# Patient Record
Sex: FEMALE | Race: WHITE | NOT HISPANIC OR LATINO | URBAN - METROPOLITAN AREA
[De-identification: names, ages, dates, MRNs, and addresses within clinical notes are randomized per-mention and may not be internally consistent; named-entity substitution may affect disease eponyms.]

---

## 2017-02-21 ENCOUNTER — OUTPATIENT (OUTPATIENT)
Dept: OUTPATIENT SERVICES | Facility: HOSPITAL | Age: 47
LOS: 1 days | End: 2017-02-21
Payer: COMMERCIAL

## 2017-02-21 VITALS
DIASTOLIC BLOOD PRESSURE: 85 MMHG | HEART RATE: 64 BPM | RESPIRATION RATE: 18 BRPM | HEIGHT: 65 IN | TEMPERATURE: 98 F | WEIGHT: 147.05 LBS | SYSTOLIC BLOOD PRESSURE: 134 MMHG

## 2017-02-21 DIAGNOSIS — D17.20 BENIGN LIPOMATOUS NEOPLASM OF SKIN AND SUBCUTANEOUS TISSUE OF UNSPECIFIED LIMB: ICD-10-CM

## 2017-02-21 DIAGNOSIS — R22.30 LOCALIZED SWELLING, MASS AND LUMP, UNSPECIFIED UPPER LIMB: ICD-10-CM

## 2017-02-21 DIAGNOSIS — Z98.891 HISTORY OF UTERINE SCAR FROM PREVIOUS SURGERY: Chronic | ICD-10-CM

## 2017-02-21 DIAGNOSIS — Z01.818 ENCOUNTER FOR OTHER PREPROCEDURAL EXAMINATION: ICD-10-CM

## 2017-02-21 DIAGNOSIS — Z90.89 ACQUIRED ABSENCE OF OTHER ORGANS: Chronic | ICD-10-CM

## 2017-02-21 DIAGNOSIS — Z98.890 OTHER SPECIFIED POSTPROCEDURAL STATES: Chronic | ICD-10-CM

## 2017-02-21 LAB
ALBUMIN SERPL ELPH-MCNC: 4.1 G/DL — SIGNIFICANT CHANGE UP (ref 3.3–5)
ALP SERPL-CCNC: 89 U/L — SIGNIFICANT CHANGE UP (ref 40–120)
ALT FLD-CCNC: 24 U/L — SIGNIFICANT CHANGE UP (ref 12–78)
ANION GAP SERPL CALC-SCNC: 8 MMOL/L — SIGNIFICANT CHANGE UP (ref 5–17)
AST SERPL-CCNC: 17 U/L — SIGNIFICANT CHANGE UP (ref 15–37)
BILIRUB SERPL-MCNC: 0.3 MG/DL — SIGNIFICANT CHANGE UP (ref 0.2–1.2)
BUN SERPL-MCNC: 13 MG/DL — SIGNIFICANT CHANGE UP (ref 7–23)
CALCIUM SERPL-MCNC: 8.9 MG/DL — SIGNIFICANT CHANGE UP (ref 8.5–10.1)
CHLORIDE SERPL-SCNC: 105 MMOL/L — SIGNIFICANT CHANGE UP (ref 96–108)
CO2 SERPL-SCNC: 28 MMOL/L — SIGNIFICANT CHANGE UP (ref 22–31)
CREAT SERPL-MCNC: 0.7 MG/DL — SIGNIFICANT CHANGE UP (ref 0.5–1.3)
GLUCOSE SERPL-MCNC: 88 MG/DL — SIGNIFICANT CHANGE UP (ref 70–99)
HCT VFR BLD CALC: 44.5 % — SIGNIFICANT CHANGE UP (ref 34.5–45)
HGB BLD-MCNC: 14.8 G/DL — SIGNIFICANT CHANGE UP (ref 11.5–15.5)
MCHC RBC-ENTMCNC: 30 PG — SIGNIFICANT CHANGE UP (ref 27–34)
MCHC RBC-ENTMCNC: 33.2 GM/DL — SIGNIFICANT CHANGE UP (ref 32–36)
MCV RBC AUTO: 90.3 FL — SIGNIFICANT CHANGE UP (ref 80–100)
PLATELET # BLD AUTO: 264 K/UL — SIGNIFICANT CHANGE UP (ref 150–400)
POTASSIUM SERPL-MCNC: 3.7 MMOL/L — SIGNIFICANT CHANGE UP (ref 3.5–5.3)
POTASSIUM SERPL-SCNC: 3.7 MMOL/L — SIGNIFICANT CHANGE UP (ref 3.5–5.3)
PROT SERPL-MCNC: 7 G/DL — SIGNIFICANT CHANGE UP (ref 6–8.3)
RBC # BLD: 4.93 M/UL — SIGNIFICANT CHANGE UP (ref 3.8–5.2)
RBC # FLD: 11.9 % — SIGNIFICANT CHANGE UP (ref 10.3–14.5)
SODIUM SERPL-SCNC: 141 MMOL/L — SIGNIFICANT CHANGE UP (ref 135–145)
WBC # BLD: 5.4 K/UL — SIGNIFICANT CHANGE UP (ref 3.8–10.5)
WBC # FLD AUTO: 5.4 K/UL — SIGNIFICANT CHANGE UP (ref 3.8–10.5)

## 2017-02-21 PROCEDURE — 84703 CHORIONIC GONADOTROPIN ASSAY: CPT

## 2017-02-21 PROCEDURE — 80053 COMPREHEN METABOLIC PANEL: CPT

## 2017-02-21 PROCEDURE — G0463: CPT

## 2017-02-21 PROCEDURE — 85027 COMPLETE CBC AUTOMATED: CPT

## 2017-02-21 PROCEDURE — 84702 CHORIONIC GONADOTROPIN TEST: CPT

## 2017-02-21 NOTE — H&P PST ADULT - FAMILY HISTORY
Father  Still living? Yes, Estimated age: Age Unknown  Family history of diabetes mellitus, Age at diagnosis: Age Unknown  Family history of stroke, Age at diagnosis: Age Unknown  Family history of seizures, Age at diagnosis: Age Unknown  Family history of alcoholism in father, Age at diagnosis: Age Unknown     Mother  Still living? Yes, Estimated age: Age Unknown  Family history of diabetes mellitus, Age at diagnosis: Age Unknown  Family history of hypercholesterolemia, Age at diagnosis: Age Unknown  Family history of hypertension in mother, Age at diagnosis: Age Unknown

## 2017-02-21 NOTE — H&P PST ADULT - SKIN COMMENTS
Right shoulder: palpable, mobile, round, non-tender mass posterior aspect of right shoulder - overlying skin intact without erythema or discoloration

## 2017-02-21 NOTE — H&P PST ADULT - HISTORY OF PRESENT ILLNESS
This is a pleasant 47 yo female who presents for right posterior shoulder mass excision with repair.  Patient reports her lipoma is being removed - first noticed in her twenties, and in the last 5 years has increased in size and has become painful.  Reports associated mild parasthesias that radiate down the right arm as well.  MRI with contrast performed at [facility] in [place] - as per patient, lesion is benign.

## 2017-02-21 NOTE — H&P PST ADULT - NSANTHOSAYNRD_GEN_A_CORE
No. JUANA screening performed.  STOP BANG Legend: 0-2 = LOW Risk; 3-4 = INTERMEDIATE Risk; 5-8 = HIGH Risk

## 2017-02-21 NOTE — H&P PST ADULT - RS GEN PE MLT RESP DETAILS PC
clear to auscultation bilaterally/respirations non-labored/airway patent/breath sounds equal/good air movement/normal

## 2017-02-23 RX ORDER — SODIUM CHLORIDE 9 MG/ML
1000 INJECTION, SOLUTION INTRAVENOUS
Qty: 0 | Refills: 0 | Status: DISCONTINUED | OUTPATIENT
Start: 2017-02-27 | End: 2017-03-14

## 2017-02-27 ENCOUNTER — OUTPATIENT (OUTPATIENT)
Dept: OUTPATIENT SERVICES | Facility: HOSPITAL | Age: 47
LOS: 1 days | Discharge: HOME | End: 2017-02-27
Payer: COMMERCIAL

## 2017-02-27 VITALS — HEART RATE: 75 BPM | DIASTOLIC BLOOD PRESSURE: 73 MMHG | SYSTOLIC BLOOD PRESSURE: 119 MMHG | TEMPERATURE: 98 F

## 2017-02-27 VITALS
SYSTOLIC BLOOD PRESSURE: 118 MMHG | WEIGHT: 147.05 LBS | HEART RATE: 73 BPM | TEMPERATURE: 100 F | OXYGEN SATURATION: 100 % | DIASTOLIC BLOOD PRESSURE: 82 MMHG | RESPIRATION RATE: 15 BRPM | HEIGHT: 65 IN

## 2017-02-27 DIAGNOSIS — Z98.890 OTHER SPECIFIED POSTPROCEDURAL STATES: Chronic | ICD-10-CM

## 2017-02-27 DIAGNOSIS — Z98.891 HISTORY OF UTERINE SCAR FROM PREVIOUS SURGERY: Chronic | ICD-10-CM

## 2017-02-27 DIAGNOSIS — D17.20 BENIGN LIPOMATOUS NEOPLASM OF SKIN AND SUBCUTANEOUS TISSUE OF UNSPECIFIED LIMB: ICD-10-CM

## 2017-02-27 DIAGNOSIS — Z90.89 ACQUIRED ABSENCE OF OTHER ORGANS: Chronic | ICD-10-CM

## 2017-02-27 PROCEDURE — 88304 TISSUE EXAM BY PATHOLOGIST: CPT | Mod: 26

## 2017-02-27 PROCEDURE — 23073 EXC SHOULDER TUM DEEP 5 CM/>: CPT | Mod: RT

## 2017-02-27 PROCEDURE — 88304 TISSUE EXAM BY PATHOLOGIST: CPT

## 2017-02-27 RX ORDER — DOCUSATE SODIUM 100 MG
1 CAPSULE ORAL
Qty: 30 | Refills: 0 | OUTPATIENT
Start: 2017-02-27

## 2017-02-27 RX ORDER — CEFAZOLIN SODIUM 1 G
2000 VIAL (EA) INJECTION ONCE
Qty: 0 | Refills: 0 | Status: COMPLETED | OUTPATIENT
Start: 2017-02-27 | End: 2017-02-27

## 2017-02-27 RX ORDER — OXYCODONE HYDROCHLORIDE 5 MG/1
5 TABLET ORAL EVERY 4 HOURS
Qty: 0 | Refills: 0 | Status: DISCONTINUED | OUTPATIENT
Start: 2017-02-27 | End: 2017-02-27

## 2017-02-27 RX ORDER — SODIUM CHLORIDE 9 MG/ML
1000 INJECTION, SOLUTION INTRAVENOUS
Qty: 0 | Refills: 0 | Status: DISCONTINUED | OUTPATIENT
Start: 2017-02-27 | End: 2017-02-27

## 2017-02-27 RX ORDER — ACETAMINOPHEN WITH CODEINE 300MG-30MG
1 TABLET ORAL
Qty: 20 | Refills: 0 | OUTPATIENT
Start: 2017-02-27

## 2017-02-27 RX ORDER — ONDANSETRON 8 MG/1
4 TABLET, FILM COATED ORAL ONCE
Qty: 0 | Refills: 0 | Status: DISCONTINUED | OUTPATIENT
Start: 2017-02-27 | End: 2017-02-27

## 2017-02-27 RX ORDER — HYDROMORPHONE HYDROCHLORIDE 2 MG/ML
0.5 INJECTION INTRAMUSCULAR; INTRAVENOUS; SUBCUTANEOUS
Qty: 0 | Refills: 0 | Status: DISCONTINUED | OUTPATIENT
Start: 2017-02-27 | End: 2017-02-27

## 2017-02-27 RX ADMIN — SODIUM CHLORIDE 100 MILLILITER(S): 9 INJECTION, SOLUTION INTRAVENOUS at 10:23

## 2017-02-27 RX ADMIN — SODIUM CHLORIDE 75 MILLILITER(S): 9 INJECTION, SOLUTION INTRAVENOUS at 07:56

## 2017-02-27 NOTE — ASU DISCHARGE PLAN (ADULT/PEDIATRIC). - INSTRUCTIONS
Increase fluid intake while taking pain medication Wednsday 03/01/2017- call for appointment  Will remove Drain at visit

## 2017-02-27 NOTE — ASU DISCHARGE PLAN (ADULT/PEDIATRIC). - MEDICATION SUMMARY - MEDICATIONS TO TAKE
I will START or STAY ON the medications listed below when I get home from the hospital:    Tylenol with Codeine #3 300 mg-30 mg oral tablet  -- 1 tab(s) by mouth every 6 hours, As Needed -for severe pain MDD:4  -- Caution federal law prohibits the transfer of this drug to any person other  than the person for whom it was prescribed.  May cause drowsiness.  Alcohol may intensify this effect.  Use care when operating dangerous machinery.  This product contains acetaminophen.  Do not use  with any other product containing acetaminophen to prevent possible liver damage.  Using more of this medication than prescribed may cause serious breathing problems.    -- Indication: For pain medication    Colace sodium 100 mg oral capsule  -- 1 cap(s) by mouth 3 times a day, As Needed -for constipation  -- Medication should be taken with plenty of water.    -- Indication: For anti constipation

## 2017-02-27 NOTE — ASU DISCHARGE PLAN (ADULT/PEDIATRIC). - NOTIFY
Swelling that continues/Numbness, tingling/Pain not relieved by Medications/Fever greater than 101/Numbness, color, or temperature change to extremity/Bleeding that does not stop

## 2017-02-28 LAB — SURGICAL PATHOLOGY FINAL REPORT - CH: SIGNIFICANT CHANGE UP

## 2017-03-02 DIAGNOSIS — R22.31 LOCALIZED SWELLING, MASS AND LUMP, RIGHT UPPER LIMB: ICD-10-CM

## 2017-03-02 DIAGNOSIS — D17.9 BENIGN LIPOMATOUS NEOPLASM, UNSPECIFIED: ICD-10-CM

## 2018-04-11 PROBLEM — Z00.00 ENCOUNTER FOR PREVENTIVE HEALTH EXAMINATION: Status: ACTIVE | Noted: 2018-04-11

## 2018-04-25 ENCOUNTER — APPOINTMENT (OUTPATIENT)
Age: 48
End: 2018-04-25
Payer: COMMERCIAL

## 2018-04-25 ENCOUNTER — OTHER (OUTPATIENT)
Age: 48
End: 2018-04-25

## 2018-04-25 ENCOUNTER — APPOINTMENT (OUTPATIENT)
Dept: VASCULAR SURGERY | Facility: CLINIC | Age: 48
End: 2018-04-25
Payer: COMMERCIAL

## 2018-04-25 VITALS
HEART RATE: 61 BPM | WEIGHT: 139 LBS | TEMPERATURE: 98 F | DIASTOLIC BLOOD PRESSURE: 77 MMHG | HEIGHT: 65 IN | OXYGEN SATURATION: 97 % | SYSTOLIC BLOOD PRESSURE: 110 MMHG | BODY MASS INDEX: 23.16 KG/M2

## 2018-04-25 DIAGNOSIS — Z71.9 COUNSELING, UNSPECIFIED: ICD-10-CM

## 2018-04-25 DIAGNOSIS — Z83.3 FAMILY HISTORY OF DIABETES MELLITUS: ICD-10-CM

## 2018-04-25 DIAGNOSIS — Z82.3 FAMILY HISTORY OF STROKE: ICD-10-CM

## 2018-04-25 DIAGNOSIS — Z80.9 FAMILY HISTORY OF MALIGNANT NEOPLASM, UNSPECIFIED: ICD-10-CM

## 2018-04-25 PROCEDURE — 99204 OFFICE O/P NEW MOD 45 MIN: CPT

## 2018-04-25 PROCEDURE — 93970 EXTREMITY STUDY: CPT

## 2018-04-25 RX ORDER — NAPROXEN 500 MG/1
500 TABLET ORAL
Qty: 28 | Refills: 0 | Status: ACTIVE | COMMUNITY
Start: 2017-12-26

## 2018-05-21 ENCOUNTER — APPOINTMENT (OUTPATIENT)
Dept: VASCULAR SURGERY | Facility: CLINIC | Age: 48
End: 2018-05-21

## 2018-05-24 ENCOUNTER — MESSAGE (OUTPATIENT)
Age: 48
End: 2018-05-24

## 2018-07-16 ENCOUNTER — APPOINTMENT (OUTPATIENT)
Dept: VASCULAR SURGERY | Facility: CLINIC | Age: 48
End: 2018-07-16
Payer: COMMERCIAL

## 2018-07-16 VITALS
SYSTOLIC BLOOD PRESSURE: 124 MMHG | HEIGHT: 65 IN | BODY MASS INDEX: 23.16 KG/M2 | WEIGHT: 139 LBS | DIASTOLIC BLOOD PRESSURE: 85 MMHG

## 2018-07-16 PROCEDURE — 36471 NJX SCLRSNT MLT INCMPTNT VN: CPT | Mod: RT

## 2018-07-16 PROCEDURE — 36471 NJX SCLRSNT MLT INCMPTNT VN: CPT

## 2018-07-23 ENCOUNTER — APPOINTMENT (OUTPATIENT)
Dept: VASCULAR SURGERY | Facility: CLINIC | Age: 48
End: 2018-07-23
Payer: COMMERCIAL

## 2018-07-23 VITALS
TEMPERATURE: 98.3 F | SYSTOLIC BLOOD PRESSURE: 117 MMHG | HEART RATE: 78 BPM | BODY MASS INDEX: 23.16 KG/M2 | WEIGHT: 139 LBS | OXYGEN SATURATION: 98 % | DIASTOLIC BLOOD PRESSURE: 76 MMHG | HEIGHT: 65 IN

## 2018-07-23 PROCEDURE — 36471 NJX SCLRSNT MLT INCMPTNT VN: CPT | Mod: RT

## 2018-07-30 ENCOUNTER — APPOINTMENT (OUTPATIENT)
Dept: VASCULAR SURGERY | Facility: CLINIC | Age: 48
End: 2018-07-30
Payer: COMMERCIAL

## 2018-07-30 ENCOUNTER — APPOINTMENT (OUTPATIENT)
Dept: VASCULAR SURGERY | Facility: CLINIC | Age: 48
End: 2018-07-30

## 2018-07-30 VITALS
WEIGHT: 139 LBS | OXYGEN SATURATION: 99 % | HEART RATE: 77 BPM | SYSTOLIC BLOOD PRESSURE: 117 MMHG | BODY MASS INDEX: 23.16 KG/M2 | HEIGHT: 65 IN | DIASTOLIC BLOOD PRESSURE: 77 MMHG

## 2018-07-30 PROCEDURE — 36471 NJX SCLRSNT MLT INCMPTNT VN: CPT

## 2018-08-13 ENCOUNTER — APPOINTMENT (OUTPATIENT)
Dept: VASCULAR SURGERY | Facility: CLINIC | Age: 48
End: 2018-08-13
Payer: COMMERCIAL

## 2018-08-13 VITALS
WEIGHT: 140 LBS | HEART RATE: 78 BPM | TEMPERATURE: 98 F | BODY MASS INDEX: 23.32 KG/M2 | SYSTOLIC BLOOD PRESSURE: 118 MMHG | DIASTOLIC BLOOD PRESSURE: 77 MMHG | HEIGHT: 65 IN

## 2018-08-13 PROCEDURE — 36471 NJX SCLRSNT MLT INCMPTNT VN: CPT | Mod: LT

## 2018-08-15 ENCOUNTER — APPOINTMENT (OUTPATIENT)
Dept: VASCULAR SURGERY | Facility: CLINIC | Age: 48
End: 2018-08-15

## 2018-10-24 NOTE — ASU PREOP CHECKLIST - BMI (KG/M2)
24.5
I have personally seen and examined this patient.  I have fully participated in the care of this patient. I have reviewed all pertinent clinical information, including history, physical exam, plan and the Resident’s note and agree except as noted.

## 2019-03-15 PROBLEM — K90.0 CELIAC DISEASE: Chronic | Status: ACTIVE | Noted: 2017-02-21

## 2019-04-22 ENCOUNTER — APPOINTMENT (OUTPATIENT)
Dept: ORTHOPEDIC SURGERY | Facility: CLINIC | Age: 49
End: 2019-04-22

## 2019-07-01 ENCOUNTER — APPOINTMENT (OUTPATIENT)
Dept: ORTHOPEDIC SURGERY | Facility: CLINIC | Age: 49
End: 2019-07-01
Payer: COMMERCIAL

## 2019-07-01 VITALS
DIASTOLIC BLOOD PRESSURE: 76 MMHG | SYSTOLIC BLOOD PRESSURE: 130 MMHG | HEART RATE: 59 BPM | WEIGHT: 140 LBS | BODY MASS INDEX: 23.32 KG/M2 | TEMPERATURE: 97.7 F | HEIGHT: 65 IN

## 2019-07-01 DIAGNOSIS — Z78.9 OTHER SPECIFIED HEALTH STATUS: ICD-10-CM

## 2019-07-01 PROCEDURE — 99203 OFFICE O/P NEW LOW 30 MIN: CPT

## 2019-07-01 PROCEDURE — 73120 X-RAY EXAM OF HAND: CPT | Mod: RT

## 2019-07-08 NOTE — REVIEW OF SYSTEMS
[Joint Stiffness] : joint stiffness [Joint Pain] : joint pain [Feeling Weak] : feeling weak [Joint Swelling] : joint swelling [Negative] : Heme/Lymph

## 2019-07-09 ENCOUNTER — APPOINTMENT (OUTPATIENT)
Dept: ORTHOPEDIC SURGERY | Facility: CLINIC | Age: 49
End: 2019-07-09
Payer: COMMERCIAL

## 2019-07-09 VITALS — SYSTOLIC BLOOD PRESSURE: 97 MMHG | DIASTOLIC BLOOD PRESSURE: 64 MMHG | HEART RATE: 68 BPM

## 2019-07-09 VITALS — WEIGHT: 140 LBS | HEIGHT: 65 IN | BODY MASS INDEX: 23.32 KG/M2

## 2019-07-09 DIAGNOSIS — M65.9 SYNOVITIS AND TENOSYNOVITIS, UNSPECIFIED: ICD-10-CM

## 2019-07-09 DIAGNOSIS — M75.81 OTHER SHOULDER LESIONS, RIGHT SHOULDER: ICD-10-CM

## 2019-07-09 DIAGNOSIS — M75.82 OTHER SHOULDER LESIONS, LEFT SHOULDER: ICD-10-CM

## 2019-07-09 DIAGNOSIS — M65.341 TRIGGER FINGER, RIGHT RING FINGER: ICD-10-CM

## 2019-07-09 DIAGNOSIS — M65.311 TRIGGER THUMB, RIGHT THUMB: ICD-10-CM

## 2019-07-09 PROCEDURE — 99204 OFFICE O/P NEW MOD 45 MIN: CPT | Mod: 25

## 2019-07-09 PROCEDURE — 20550 NJX 1 TENDON SHEATH/LIGAMENT: CPT | Mod: F8

## 2019-07-09 NOTE — PHYSICAL EXAM
[Normal] : Oriented to person, place, and time, insight and judgement were intact and the affect was normal [de-identified] : There is positive swelling and tenderness localized to the A1 pulley of the right thumb and ring digits with locking upon compression of the pulley.. There is no evidence of infection. No tenderness of the MP, PIP or DIP joint and no evidence of instability bilaterally. The FDS FDP and extensor mechanism is intact without instability and with 5 over 5 strength bilaterally in the digits.\par \par Bilateral Shoulder Exam\par \par Inspection: No malalignment, No defects\par Skin: No masses, No lesions\par Neck: Negative Spurlings, full ROM without pain\par ROM: Full range of motion of bilateral shoulders with forward flexion, abduction, internal and external rotation.\par Painful arc ROM: Overhead bilaterally\par Tenderness: No bicipital tenderness, no tenderness to the greater tuberosity/RTC insertion, no anterior shoulder/lesser tuberosity tenderness\par Strength: 5/5 ER, 5/5 IR in adduction, 5/5 supraspinatus testing\par AC Joint: No ttp, no pain with cross arm testing\par Biceps: Speed negative, Yergusons negative\par Impingement test: Negative Walker\par Stability: Negative apprehension, mildly positive anterior/posterior load and shift\par Vasc: 2+ radial pulse\par Neuro: AIN, PIN, Ulnar nerve in tact to motor\par Sensation: In tact to light touch throughout\par \par

## 2019-07-09 NOTE — PROCEDURE
[FreeTextEntry1] : Patient with trigger finger to the right thumb and ring fingers. The nature of this condition was described at length with the patient she verbalized understanding. The treatment options including conservative observation, cortisone injections, and trigger finger release surgically were described at length with the patient she verbalized understanding. Today the patient wishes to proceed with a cortisone injection to the right thumb and right ring fingers both of which she tolerated well. The patient will follow back up in 4-6 weeks should she have no relief or limited relief from the injections to discuss further treatment options at that time. In regards to her shoulders, her pain and exam are consistent with rotator cuff tendinopathy. I would like to treat her with rest and anti-inflammatories and will send the patient a prescription for meloxicam.

## 2019-07-09 NOTE — HISTORY OF PRESENT ILLNESS
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  Her triggering and right hand swelling has been going on since the end of January after a course of Levaquin for an illness.  She denies any tendon ruptures.  She complains of intermittent triggering of both fingers with stiffness of the entire thumb and pain at both A1 pulleys.  She does not take any over-the-counter medications.  She also complains of bilateral shoulder pain since the end of January.  The pain is achy and throbbing in nature and located posteriorly and superiorly.  She states her pain is worse with activities and better with rest.  She denies any paresthesias.  She was seen by Dr. Velázquez prior to today and referred here for further treatment options.  She has had 1 cortisone injection about 5 years ago in the right ring finger.

## 2019-07-11 NOTE — PHYSICAL EXAM
[Normal] : Gait: normal [de-identified] : Left hand:\par \par Fingers: Range of Motion in Degrees:\par                                    					           Claimant:  	   Normal:  \par \par Thumb Interphalangeal Hyperextension/Flexion		                           15H/80             15H/80\par \par Thumb Metacarpophalangeal Hyperextension/Flexion	                           10/55	    10/55\par \par Finger DIP Joints Extension/Flexion				             0/80	     0/80\par \par Finger PIP Joints Extension/Flexion 				             0/100	     0/100\par \par Finger MCP Joints Hyperextension/Flexion 			      (0-45H)/90	     (0-45H)/90\par \par FDS, FDP intact.  No triggering.  No tenderness or swelling over the A1 pulley for each finger.  No instability to varus or valgus stress.  No motor or sensory deficits.   Less than two second capillary refill.  Skin is intact.  No rashes, scars or lesions.\par \par Right hand:\par Diffuse swelling:\par \par Fingers: Range of Motion in Degrees\par                                                                                                 Claimant:  	 Normal:  \par \par Thumb Interphalangeal Hyperextension/Flexion		15H/80		15H/80\par \par Thumb Metacarpophalangeal Hyperextension/Flexion	10/55		10/55\par \par Finger DIP Joints Extension/Flexion		                 0/80		0/80\par \par Finger PIP Joints Extension/Flexion 			0/100		0/100\par \par Finger MCP Joints Hyperextension/Flexion 		(0-45H)/90	(0-45H)/90\par \par \par FDS, FDP intact.  Positive triggering ring and thumb..  Positive tenderness at the A1 pulley ring and thumb.  No instability to varus or valgus stress.  No gross neurologic or vascular deficits distally.  Less than two second capillary refill.  Skin is intact.  No rashes, scars or lesions.\par  \par   [de-identified] : Station: normal [de-identified] : Radiographs, two views of the right hand, show no obvious osseous abnormality.

## 2019-07-11 NOTE — ADDENDUM
[FreeTextEntry1] : This note was written by Miriam Champagne on 07/08/2019 acting as scribe for Sean Velázquez III, MD

## 2019-07-11 NOTE — DISCUSSION/SUMMARY
[de-identified] : The patient presents with trigger finger, right ring and thumb.  At this time she is being referred to Dr. Zhong for definitive management.

## 2019-07-11 NOTE — HISTORY OF PRESENT ILLNESS
[8] : a current pain level of 8/10 [10  (interferes completely)] : the ailment interference is10/10 (interferes completely) [de-identified] : She comes in today with a long history of multiple issues of tendinitis status post Levaquin over the past six months - persistent complaints of pain and swelling, especially to her right hand. The patient describes the pain as achy, crampy and "trigger not working".  The patient states rest and heat make the symptoms better while lying down and using makes the symptoms better. [] : No

## 2019-07-11 NOTE — CONSULT LETTER
[Sincerely,] : Sincerely, [Please see my note below.] : Please see my note below. [Dear  ___] : Dear  [unfilled], [Consult Letter:] : I had the pleasure of evaluating your patient, [unfilled]. [Consult Closing:] : Thank you very much for allowing me to participate in the care of this patient.  If you have any questions, please do not hesitate to contact me. [DrNewton  ___] : Dr. DIAZ [FreeTextEntry3] : Sean Velázquez III, MD \par PARAG/ilir

## 2019-08-20 ENCOUNTER — APPOINTMENT (OUTPATIENT)
Dept: ORTHOPEDIC SURGERY | Facility: CLINIC | Age: 49
End: 2019-08-20

## 2019-08-30 ENCOUNTER — APPOINTMENT (OUTPATIENT)
Dept: VASCULAR SURGERY | Facility: CLINIC | Age: 49
End: 2019-08-30
Payer: COMMERCIAL

## 2019-08-30 ENCOUNTER — APPOINTMENT (OUTPATIENT)
Age: 49
End: 2019-08-30
Payer: COMMERCIAL

## 2019-08-30 VITALS
SYSTOLIC BLOOD PRESSURE: 109 MMHG | HEIGHT: 65 IN | OXYGEN SATURATION: 97 % | BODY MASS INDEX: 23.32 KG/M2 | WEIGHT: 140 LBS | HEART RATE: 70 BPM | DIASTOLIC BLOOD PRESSURE: 76 MMHG

## 2019-08-30 PROCEDURE — 99213 OFFICE O/P EST LOW 20 MIN: CPT

## 2019-08-30 PROCEDURE — 93970 EXTREMITY STUDY: CPT

## 2019-09-05 NOTE — PROCEDURE
[FreeTextEntry1] : venous duplex revealed no DVT or SVT. Definitely worsened reflux and diameter on left GSV consistent with venous insufficiency

## 2019-09-05 NOTE — ASSESSMENT
[FreeTextEntry1] : The patient is a 47 y/o female with pain and swelling from varicose veins that initially responded to sclerotherapy but has now recurred and especially worse on left leg. Duplex showed insufficiency of left greater saphenous vein which is consistent with her symptoms of pain and aching after standing for a period of time. Has hyperpigmentation changes as well.  Conservative treatments with support stockings and elevation have failed to improve symptoms. Would benefit from endovenous ablation of left GSV since the large tributaries were treated. She understands and agrees.\par \par Plan\par Continue to ambulate frequently, elevate legs at rest.\par Discussed the need for RF ablation of left GSV\par Continue use of compression stockings\par RTC for procedure\par :

## 2019-09-05 NOTE — PHYSICAL EXAM
[JVD] : no jugular venous distention  [Carotid Bruits] : no carotid bruits [Normal Breath Sounds] : Normal breath sounds [Normal Heart Sounds] : normal heart sounds [Abdominal Aorta] : Normal abdominal aorta [2+] : left 2+ [Ankle Swelling (On Exam)] : present [Varicose Veins Of Lower Extremities] : bilaterally [] : present [Ankle Swelling On The Right] : mild [Ankle Swelling On The Left] : moderate [No Rash or Lesion] : No rash or lesion [Alert] : alert [Oriented to Time] : oriented to time [Oriented to Person] : oriented to person [Oriented to Place] : oriented to place [Calm] : calm [de-identified] : awake alert [de-identified] : MOM, PERRLA [de-identified] : left leg swelling and bulging varicose veins in the medial thigh and calf. Hyperpigmentation of the distal thigh and calf.

## 2019-09-09 PROBLEM — M25.562 LEFT KNEE PAIN, UNSPECIFIED CHRONICITY: Status: ACTIVE | Noted: 2019-09-09

## 2019-09-11 ENCOUNTER — APPOINTMENT (OUTPATIENT)
Age: 49
End: 2019-09-11
Payer: COMMERCIAL

## 2019-09-11 VITALS
TEMPERATURE: 99 F | HEIGHT: 65 IN | BODY MASS INDEX: 23.32 KG/M2 | WEIGHT: 140 LBS | DIASTOLIC BLOOD PRESSURE: 73 MMHG | SYSTOLIC BLOOD PRESSURE: 123 MMHG | HEART RATE: 66 BPM

## 2019-09-11 DIAGNOSIS — M25.562 PAIN IN LEFT KNEE: ICD-10-CM

## 2019-09-11 PROCEDURE — 73560 X-RAY EXAM OF KNEE 1 OR 2: CPT | Mod: LT

## 2019-09-11 PROCEDURE — 99213 OFFICE O/P EST LOW 20 MIN: CPT | Mod: 25

## 2019-09-11 PROCEDURE — 20610 DRAIN/INJ JOINT/BURSA W/O US: CPT | Mod: LT

## 2019-09-16 NOTE — PROCEDURE
[de-identified] : Consent: \par At this time, I have recommended an injection to the left knee.  The risks and benefits of the procedure were discussed with the patient in detail.  Upon verbal consent of the patient, we proceeded with the injection as noted below.  \par \par Procedure:  \par After a sterile prep, the patient underwent an injection of 9 cc of 1% Lidocaine without epinephrine and 1 cc of Kenalog into the left knee.  The patient tolerated the procedure well.  There were no complications.  \par \par

## 2019-09-16 NOTE — ADDENDUM
[FreeTextEntry1] : This note was written by Miriam Champagne on 09/16/2019 acting as scribe for Sean Velázquez III, MD

## 2019-09-16 NOTE — PHYSICAL EXAM
[de-identified] : Right knee:\par Knee: Range of Motion in Degrees	\par 	                  Claimant:	Normal:	\par Flexion Active	  135 	                135-degrees	\par Flexion Passive	  135	                135-degrees	\par Extension Active	  0-5	                0-5-degrees	\par Extension Passive	  0-5	                0-5-degrees	\par \par No weakness to flexion/extension.  No evidence of instability in the AP plane or varus or valgus stress.  Negative  Lachman.  Negative pivot shift.  Negative anterior drawer test.  Negative posterior drawer test.  Negative Jc.  Negative Apley grind.  No medial or lateral joint line tenderness.  No tenderness over the medial and lateral facet of the patella.  No patellofemoral crepitations.  No lateral tilting patella.  No patellar apprehension.  No crepitation in the medial and lateral femoral condyle.  No proximal or distal swelling, edema or tenderness.  No gross motor or sensory deficits.  No intra-articular swelling.  2+ DP and PT pulses. No varus or valgus malalignment.  Skin is intact.  No rashes, scars or lesions.  \par  \par Left knee:\par Knee: Range of Motion in Degrees	\par 	                  Claimant:	Normal:	\par Flexion Active	  135 	               135-degrees	\par Flexion Passive	  135	               135-degrees	\par Extension Active	  0-5	               0-5-degrees	\par Extension Passive     0-5	               0-5-degrees	\par \par No weakness to flexion/extension.  No evidence of instability in the AP plane or varus or valgus stress.  Negative  Lachman.  Negative pivot shift.  Negative anterior drawer test.  Negative posterior drawer test.  Positive Jc.  Positive Apley grind.  Positive medial joint line tenderness.  Negative lateral joint line tenderness.  Positive tenderness over the medial and lateral facet of the patella.  Positive patellofemoral crepitations.  No lateral tilting patella.  No patellar apprehension.  Positive crepitation in the medial and lateral femoral condyle.  No proximal or distal swelling, edema or tenderness.  No gross motor or sensory deficits.  Mild effusion.  2+ DP and PT pulses.  No varus or valgus malalignment.  Skin is intact.  No rashes, scars or lesions. \par   [de-identified] : Gait: Slightly antalgic to antalgic gait.  Station: Normal  [de-identified] : Appearance: Well-developed, well-nourished female  in no acute distress.  [de-identified] : Radiographs, two views of the left knee, show mild degenerative change.

## 2019-09-16 NOTE — DISCUSSION/SUMMARY
[de-identified] : The patient presents with mild arthritis with possible meniscal tear, left knee.  At this time, I have recommended ice and elevation.  I instructed her on home therapeutic modalities.  She will be reassessed in two or three weeks.  Should her symptoms warrant, she may require further intervention, including an MRI.

## 2019-09-16 NOTE — HISTORY OF PRESENT ILLNESS
[de-identified] : Elke comes in today with complaints of pain to her left knee.  She states it has been getting a little worse recently.

## 2019-09-30 ENCOUNTER — APPOINTMENT (OUTPATIENT)
Dept: ORTHOPEDIC SURGERY | Facility: CLINIC | Age: 49
End: 2019-09-30
Payer: COMMERCIAL

## 2019-09-30 VITALS
HEART RATE: 64 BPM | DIASTOLIC BLOOD PRESSURE: 80 MMHG | SYSTOLIC BLOOD PRESSURE: 124 MMHG | BODY MASS INDEX: 23.32 KG/M2 | TEMPERATURE: 98.1 F | HEIGHT: 65 IN | WEIGHT: 140 LBS

## 2019-09-30 DIAGNOSIS — M17.12 UNILATERAL PRIMARY OSTEOARTHRITIS, LEFT KNEE: ICD-10-CM

## 2019-09-30 PROCEDURE — 99213 OFFICE O/P EST LOW 20 MIN: CPT

## 2019-10-10 NOTE — DISCUSSION/SUMMARY
[de-identified] : At this time, since the patient is doing well with osteoarthritis of the left knee, status post injection, she is instructed on home therapeutic modalities.  She will follow up on an as needed basis.

## 2019-10-10 NOTE — ADDENDUM
[FreeTextEntry1] : This note was written by Xiomara Hernandez on 10/07/2019 acting as a scribe for MIGUELINA RAMOS

## 2019-10-10 NOTE — HISTORY OF PRESENT ILLNESS
[de-identified] : The patient comes in today for her left knee.  She states she has no pain.  Occasionally she has some clicking.

## 2019-10-10 NOTE — PHYSICAL EXAM
[de-identified] : Left Knee: \par Range of Motion in Degrees	\par 	                  Claimant:	Normal:	\par Flexion Active	  135 	                135-degrees	\par Flexion Passive	  135	                135-degrees	\par Extension Active	  0-5	                0-5-degrees	\par Extension Passive	  0-5	                0-5-degrees	\par \par No weakness to flexion/extension. No evidence of instability in the AP plane or varus or valgus stress.  Negative  Lachman.  Negative pivot shift.  Negative anterior drawer test.  Negative posterior drawer test.  Negative Jc.  Negative Apley grind.  No medial or lateral joint line tenderness.  Positive tenderness over the medial and lateral facet of the patella.  Positive patellofemoral crepitations.  No lateral tilting patella.  No patella apprehension.  Positive crepitation in the medial and lateral femoral condyle.  No proximal or distal swelling, edema or tenderness.  No gross motor or sensory deficits. Mild intra-articular swelling.  2+ DP and PT pulses. No varus or valgus malalignment.  Skin is intact.  No rashes, scars or lesions.\par  [de-identified] : Ambulating with a slightly antalgic to antalgic gait.  Station:  Normal.  [de-identified] : Appearance:  Well-developed, well-nourished female in no acute distress.\par \par

## 2019-10-11 ENCOUNTER — APPOINTMENT (OUTPATIENT)
Dept: VASCULAR SURGERY | Facility: CLINIC | Age: 49
End: 2019-10-11

## 2020-01-05 ENCOUNTER — TRANSCRIPTION ENCOUNTER (OUTPATIENT)
Age: 50
End: 2020-01-05

## 2020-01-08 ENCOUNTER — APPOINTMENT (OUTPATIENT)
Dept: VASCULAR SURGERY | Facility: CLINIC | Age: 50
End: 2020-01-08
Payer: COMMERCIAL

## 2020-02-12 ENCOUNTER — APPOINTMENT (OUTPATIENT)
Dept: VASCULAR SURGERY | Facility: CLINIC | Age: 50
End: 2020-02-12
Payer: COMMERCIAL

## 2020-02-12 PROCEDURE — 36475 ENDOVENOUS RF 1ST VEIN: CPT | Mod: LT

## 2020-02-14 ENCOUNTER — APPOINTMENT (OUTPATIENT)
Dept: VASCULAR SURGERY | Facility: CLINIC | Age: 50
End: 2020-02-14
Payer: COMMERCIAL

## 2020-02-14 PROCEDURE — 93971 EXTREMITY STUDY: CPT

## 2020-03-13 ENCOUNTER — APPOINTMENT (OUTPATIENT)
Dept: VASCULAR SURGERY | Facility: CLINIC | Age: 50
End: 2020-03-13
Payer: COMMERCIAL

## 2020-03-13 VITALS
DIASTOLIC BLOOD PRESSURE: 77 MMHG | SYSTOLIC BLOOD PRESSURE: 126 MMHG | WEIGHT: 140 LBS | OXYGEN SATURATION: 100 % | HEIGHT: 65 IN | BODY MASS INDEX: 23.32 KG/M2 | HEART RATE: 80 BPM

## 2020-03-13 PROCEDURE — 36471 NJX SCLRSNT MLT INCMPTNT VN: CPT | Mod: 50

## 2020-03-31 ENCOUNTER — APPOINTMENT (OUTPATIENT)
Dept: GASTROENTEROLOGY | Facility: CLINIC | Age: 50
End: 2020-03-31

## 2020-04-08 ENCOUNTER — APPOINTMENT (OUTPATIENT)
Dept: VASCULAR SURGERY | Facility: CLINIC | Age: 50
End: 2020-04-08

## 2020-06-22 ENCOUNTER — FORM ENCOUNTER (OUTPATIENT)
Age: 50
End: 2020-06-22

## 2020-06-28 ENCOUNTER — FORM ENCOUNTER (OUTPATIENT)
Age: 50
End: 2020-06-28

## 2020-07-27 ENCOUNTER — TRANSCRIPTION ENCOUNTER (OUTPATIENT)
Age: 50
End: 2020-07-27

## 2020-08-07 ENCOUNTER — APPOINTMENT (OUTPATIENT)
Dept: VASCULAR SURGERY | Facility: CLINIC | Age: 50
End: 2020-08-07
Payer: COMMERCIAL

## 2020-08-07 VITALS
HEIGHT: 65 IN | SYSTOLIC BLOOD PRESSURE: 107 MMHG | OXYGEN SATURATION: 100 % | HEART RATE: 60 BPM | BODY MASS INDEX: 23.32 KG/M2 | DIASTOLIC BLOOD PRESSURE: 67 MMHG | TEMPERATURE: 98.3 F | WEIGHT: 140 LBS

## 2020-08-07 PROCEDURE — 36471 NJX SCLRSNT MLT INCMPTNT VN: CPT | Mod: 50

## 2020-08-14 ENCOUNTER — APPOINTMENT (OUTPATIENT)
Dept: VASCULAR SURGERY | Facility: CLINIC | Age: 50
End: 2020-08-14
Payer: COMMERCIAL

## 2020-08-14 VITALS
SYSTOLIC BLOOD PRESSURE: 94 MMHG | HEART RATE: 77 BPM | WEIGHT: 140 LBS | TEMPERATURE: 98.6 F | HEIGHT: 65 IN | DIASTOLIC BLOOD PRESSURE: 75 MMHG | BODY MASS INDEX: 23.32 KG/M2 | OXYGEN SATURATION: 97 %

## 2020-08-14 PROCEDURE — 36471 NJX SCLRSNT MLT INCMPTNT VN: CPT

## 2021-01-08 ENCOUNTER — APPOINTMENT (OUTPATIENT)
Dept: VASCULAR SURGERY | Facility: CLINIC | Age: 51
End: 2021-01-08
Payer: COMMERCIAL

## 2021-01-08 VITALS
OXYGEN SATURATION: 97 % | SYSTOLIC BLOOD PRESSURE: 119 MMHG | HEIGHT: 65 IN | TEMPERATURE: 98 F | DIASTOLIC BLOOD PRESSURE: 73 MMHG | BODY MASS INDEX: 23.32 KG/M2 | WEIGHT: 140 LBS | HEART RATE: 76 BPM

## 2021-01-08 DIAGNOSIS — Z86.79 OTHER SPECIFIED POSTPROCEDURAL STATES: ICD-10-CM

## 2021-01-08 DIAGNOSIS — Z98.890 OTHER SPECIFIED POSTPROCEDURAL STATES: ICD-10-CM

## 2021-01-08 DIAGNOSIS — M79.605 PAIN IN LEFT LEG: ICD-10-CM

## 2021-01-08 PROCEDURE — 99213 OFFICE O/P EST LOW 20 MIN: CPT

## 2021-01-08 PROCEDURE — 93971 EXTREMITY STUDY: CPT

## 2021-01-08 PROCEDURE — 99072 ADDL SUPL MATRL&STAF TM PHE: CPT

## 2021-01-08 RX ORDER — MELOXICAM 15 MG/1
15 TABLET ORAL
Qty: 30 | Refills: 1 | Status: DISCONTINUED | COMMUNITY
Start: 2019-07-09 | End: 2021-01-08

## 2021-01-21 NOTE — H&P PST ADULT - NS PRO TALK SOMEONE YN
Refill request from pharmacy for:     donepezil (ARICEPT) 5 MG tablet 90 tablet 0 10/22/2020     Sig: TAKE 1 TABLET BY MOUTH EVERY NIGHT      Last seen: 9/12/19   Next appt: none scheduled     Decline as last refill stating patient needed follow up.     
no

## 2021-04-02 ENCOUNTER — APPOINTMENT (OUTPATIENT)
Dept: VASCULAR SURGERY | Facility: CLINIC | Age: 51
End: 2021-04-02
Payer: COMMERCIAL

## 2021-04-02 VITALS
HEIGHT: 65 IN | DIASTOLIC BLOOD PRESSURE: 80 MMHG | SYSTOLIC BLOOD PRESSURE: 117 MMHG | OXYGEN SATURATION: 98 % | HEART RATE: 66 BPM

## 2021-04-02 DIAGNOSIS — I83.12 VARICOSE VEINS OF RIGHT LOWER EXTREMITY WITH INFLAMMATION: ICD-10-CM

## 2021-04-02 DIAGNOSIS — I83.11 VARICOSE VEINS OF RIGHT LOWER EXTREMITY WITH INFLAMMATION: ICD-10-CM

## 2021-04-02 DIAGNOSIS — Z98.890 OTHER SPECIFIED POSTPROCEDURAL STATES: ICD-10-CM

## 2021-04-02 PROCEDURE — 99072 ADDL SUPL MATRL&STAF TM PHE: CPT

## 2021-04-02 PROCEDURE — 36471 NJX SCLRSNT MLT INCMPTNT VN: CPT | Mod: 50

## 2022-07-27 ENCOUNTER — NON-APPOINTMENT (OUTPATIENT)
Age: 52
End: 2022-07-27

## 2022-09-26 ENCOUNTER — EMERGENCY (EMERGENCY)
Facility: HOSPITAL | Age: 52
LOS: 0 days | Discharge: HOME | End: 2022-09-26
Attending: EMERGENCY MEDICINE
Payer: COMMERCIAL

## 2022-09-26 VITALS
RESPIRATION RATE: 19 BRPM | SYSTOLIC BLOOD PRESSURE: 124 MMHG | HEART RATE: 67 BPM | WEIGHT: 134.92 LBS | DIASTOLIC BLOOD PRESSURE: 79 MMHG | OXYGEN SATURATION: 99 % | TEMPERATURE: 99 F | HEIGHT: 65 IN

## 2022-09-26 DIAGNOSIS — D80.3 SELECTIVE DEFICIENCY OF IMMUNOGLOBULIN G [IGG] SUBCLASSES: ICD-10-CM

## 2022-09-26 DIAGNOSIS — Z87.59 PERSONAL HISTORY OF OTHER COMPLICATIONS OF PREGNANCY, CHILDBIRTH AND THE PUERPERIUM: ICD-10-CM

## 2022-09-26 DIAGNOSIS — M79.671 PAIN IN RIGHT FOOT: ICD-10-CM

## 2022-09-26 DIAGNOSIS — Z88.1 ALLERGY STATUS TO OTHER ANTIBIOTIC AGENTS STATUS: ICD-10-CM

## 2022-09-26 DIAGNOSIS — Z91.018 ALLERGY TO OTHER FOODS: ICD-10-CM

## 2022-09-26 DIAGNOSIS — K90.0 CELIAC DISEASE: ICD-10-CM

## 2022-09-26 DIAGNOSIS — Z90.89 ACQUIRED ABSENCE OF OTHER ORGANS: ICD-10-CM

## 2022-09-26 DIAGNOSIS — Z98.891 HISTORY OF UTERINE SCAR FROM PREVIOUS SURGERY: Chronic | ICD-10-CM

## 2022-09-26 DIAGNOSIS — Z90.89 ACQUIRED ABSENCE OF OTHER ORGANS: Chronic | ICD-10-CM
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
## 2022-09-26 DIAGNOSIS — S92.251A DISPLACED FRACTURE OF NAVICULAR [SCAPHOID] OF RIGHT FOOT, INITIAL ENCOUNTER FOR CLOSED FRACTURE: ICD-10-CM

## 2022-09-26 DIAGNOSIS — Z98.890 OTHER SPECIFIED POSTPROCEDURAL STATES: Chronic | ICD-10-CM

## 2022-09-26 PROCEDURE — 99284 EMERGENCY DEPT VISIT MOD MDM: CPT

## 2022-09-26 PROCEDURE — 73630 X-RAY EXAM OF FOOT: CPT | Mod: RT

## 2022-09-26 PROCEDURE — 73630 X-RAY EXAM OF FOOT: CPT | Mod: 26,RT

## 2022-09-26 PROCEDURE — 99284 EMERGENCY DEPT VISIT MOD MDM: CPT | Mod: 25

## 2022-09-26 RX ORDER — IBUPROFEN 200 MG
600 TABLET ORAL ONCE
Refills: 0 | Status: COMPLETED | OUTPATIENT
Start: 2022-09-26 | End: 2022-09-26

## 2022-09-26 RX ADMIN — Medication 600 MILLIGRAM(S): at 12:12

## 2022-09-26 NOTE — ED STATDOCS - MUSCULOSKELETAL, MLM
Swelling and tenderness on the hind right foot. No lateral or medial malleolus tenderness. No knee tenderness. NVI.

## 2022-09-26 NOTE — CONSULT NOTE ADULT - SUBJECTIVE AND OBJECTIVE BOX
Date of Consult: 22  Chief Complaint :  50 y/o female with a PMHx of celiac disease, immunoglobulin deficiency presents to the ED c/o right foot pain s/p fall this morning. Pt reports she slipped on wet steps this morning. Pt says that she has been going to her local podiiatrst as her foot swelled up a few weeks ago and discussed w/ her podiatrist and they think its due a ganglion cyst. No other complaints at this time.    REVIEW OF SYSTEMS:  CONSTITUTIONAL: No weakness, fevers or chills  EYES/ENT: No visual changes;  No vertigo or throat pain   NECK: No pain or stiffness  RESPIRATORY: No cough, wheezing, hemoptysis; No shortness of breath  CARDIOVASCULAR: No chest pain or palpitations  GASTROINTESTINAL: No abdominal or epigastric pain. No nausea, vomiting, or hematemesis; No diarrhea or constipation. No melena or hematochezia.  GENITOURINARY: No dysuria, frequency or hematuria  NEUROLOGICAL: No numbness or weakness  SKIN: Ecchymosis to R foot   All other review of systems is negative unless indicated above    PMH: Celiac disease      PSH:S/P myomectomy    S/P tonsillectomy    S/P         Allergies:Levaquin (Unknown)  Wheat (Unknown)    MEDICATIONS  (STANDING):  ibuprofen  Tablet. 600 milliGRAM(s) Oral Once    MEDICATIONS  (PRN):      Vitals  T(F): 99 (22 @ 10:13), Max: 99 (22 @ 10:13)  HR: 67 (22 @ 10:13) (67 - 67)  BP: 124/79 (22 @ 10:13) (124/79 - 124/79)  RR: 19 (22 @ 10:13) (19 - 19)  SpO2: 99% (22 @ 10:13) (99% - 99%)  Wt(kg): --      Physical Exam:   Constitutiona: NAD, alert;  Derm:  Skin warm, dry and supple bilateral.    Ecchymosis to the dorsal right foot  Vascular: Dorsalis Pedis and Posterior Tibial pulses 2/4.  Capillary re-fill time less then 3 seconds digits 1-5 bilateral. + Edema noted to R foot  Neuro: Protective sensation intact to the level of the digits bilateral.  MSK: Muscle strength 4/5 in all major muscle groups of Right lower extremity. 5/5 in all muscle groups of LLE;  .  Pain on palpation to the right dorsal foot at the areas of ecchymosis, specifically dorsal medial and dorsal lateral to right foot.         Labs:      WBC Trend      Chem    Rad/EKG  < from: Xray Foot AP + Lateral + Oblique, Right (09..22 @ 11:02) >  Vertical avulsed fracture of anterior navicular bone at joint capsule   insertion. Assess for point tenderness.    < end of copied text >

## 2022-09-26 NOTE — ED ADULT NURSE NOTE - NSIMPLEMENTINTERV_GEN_ALL_ED
Implemented All Fall Risk Interventions:  Street to call system. Call bell, personal items and telephone within reach. Instruct patient to call for assistance. Room bathroom lighting operational. Non-slip footwear when patient is off stretcher. Physically safe environment: no spills, clutter or unnecessary equipment. Stretcher in lowest position, wheels locked, appropriate side rails in place. Provide visual cue, wrist band, yellow gown, etc. Monitor gait and stability. Monitor for mental status changes and reorient to person, place, and time. Review medications for side effects contributing to fall risk. Reinforce activity limits and safety measures with patient and family. Consent 3/Introductory Paragraph: I gave the patient a chance to ask questions they had about the procedure.  Following this I explained the Mohs procedure and consent was obtained. The risks, benefits and alternatives to therapy were discussed in detail. Specifically, the risks of infection, scarring, bleeding, prolonged wound healing, incomplete removal, allergy to anesthesia, nerve injury and recurrence were addressed. Prior to the procedure, the treatment site was clearly identified and confirmed by the patient. All components of Universal Protocol/PAUSE Rule completed.

## 2022-09-26 NOTE — ED STATDOCS - OBJECTIVE STATEMENT
52 y/o female with a PMHx of celiac disease, immunoglobulin deficiency presents to the ED c/o right foot pain s/p fall this morning. Pt reports she slipped on wet steps this morning. Allergies: Levaquin. No other complaints at this time.

## 2022-09-26 NOTE — ED STATDOCS - CARE PROVIDER_API CALL
Ranjan Hall (DO)  Orthopaedic Surgery  [address] [place] [address]  Phone: [phone]  Fax: [phone]  Follow Up Time:

## 2022-09-26 NOTE — ED ADULT TRIAGE NOTE - CHIEF COMPLAINT QUOTE
patient presents c/o Right foot injury.  patient reports slipping on stair, felt ankle roll.  swelling and ecchymosis noted to top of Right foot.

## 2022-09-26 NOTE — ED STATDOCS - CLINICAL SUMMARY MEDICAL DECISION MAKING FREE TEXT BOX
Pt with immunoglobulin deficiency presents s/p slip and fall on wet steps. Pt c/o right foot pain. Will XR and follow up with podiatry.

## 2022-09-26 NOTE — CONSULT NOTE ADULT - ASSESSMENT
Assesment: 52 y/o female see in the ED for the following   -Vertical avulsed fracture of anterior navicular right foot  -Pain in Right Foot   -Difficulty with ambulation      Plan :   Chart reviewed and Patient evaluated; discussed treatment and plan with Dr. Ranjan Hall  Discussed diagnosis and treatment with patient  Applied Garcia compression and posterior splint to Right lower extremity  X-rays reviewed : official read Vertical avulsed fracture of anterior navicular right foot  Pt to be NWB to RLE w/ crutches  Patient demonstrated verbal understanding of all interventions and tolerated interventions well without any complications.   Pt to follow w/ Dr. Ranjan Hall 1 week after discharge

## 2022-09-26 NOTE — ED STATDOCS - NSICDXFAMILYHX_GEN_ALL_CORE_FT
FAMILY HISTORY:  Father  Still living? Yes, Estimated age: Age Unknown  Family history of alcoholism in father, Age at diagnosis: Age Unknown  Family history of diabetes mellitus, Age at diagnosis: Age Unknown  Family history of seizures, Age at diagnosis: Age Unknown  Family history of stroke, Age at diagnosis: Age Unknown    Mother  Still living? Yes, Estimated age: Age Unknown  Family history of diabetes mellitus, Age at diagnosis: Age Unknown  Family history of hypercholesterolemia, Age at diagnosis: Age Unknown  Family history of hypertension in mother, Age at diagnosis: Age Unknown

## 2022-09-26 NOTE — ED STATDOCS - NSFOLLOWUPINSTRUCTIONS_ED_ALL_ED_FT
Rest, keep extremity elevated whenever possible  Do not bear weight on right lower extrmity, use crutches to ambulate  Apply ice to affected area 15 min on/15 min off  Keep splint clean, dry and intact  Take Motrin 600mg every 8 hours with food as needed for pain  Take Tylenol 500mg 1-2 tabs every 6 hours as needed  Follow up with your PMD within 2-3 days  Follow up with Podiatrist within one week  Return to the ER for worsening

## 2022-09-26 NOTE — ED ADULT NURSE NOTE - OBJECTIVE STATEMENT
Patient s/p slip and fall down a few front steps today hurt her right ankle. Patient had an injury to her ankle 3 weeks prior where she felt it filled up with air and then deflated after she took advils. Patient was at podiatrist office last Monday and they say that a cyst may have ruptured. Had it Xrayed in office and was told that she has a whole bunch of bone spurs. Denies LOC, denies headstrike.

## 2022-09-26 NOTE — ED STATDOCS - NS ED ATTENDING STATEMENT MOD
This was a shared visit with the NIKKI. I reviewed and verified the documentation and independently performed the documented:

## 2022-09-26 NOTE — ED STATDOCS - ATTENDING APP SHARED VISIT CONTRIBUTION OF CARE
I personally saw the patient with the NIKKI, and completed the key components of the history and physical exam. I then discussed the management plan with the NIKKI.

## 2022-09-26 NOTE — ED STATDOCS - PATIENT PORTAL LINK FT
You can access the FollowMyHealth Patient Portal offered by [facility] by registering at the following website: [email or web address]. By joining "[employer]’s FollowMyHealth portal, you will also be able to view your health information using other applications (apps) compatible with our system.

## 2022-09-26 NOTE — ED STATDOCS - PROGRESS NOTE DETAILS
Pt evaluated with attending, s/p mechanical slip and fall presents with right ankle injury. Rt ankle swollen and ttp over lateral malleolus. Xray pending, will reassess. +navicular bone fx, discussed results with pt, podiatry called for consult -- informed pts podiatrist's office, Dr Doe, and informed of pts injury and need for f/u podiatry consulted, splint applied, pt stable for dc home with podiatry follow up

## 2022-09-30 ENCOUNTER — APPOINTMENT (OUTPATIENT)
Dept: ORTHOPEDIC SURGERY | Facility: CLINIC | Age: 52
End: 2022-09-30

## 2022-09-30 ENCOUNTER — NON-APPOINTMENT (OUTPATIENT)
Age: 52
End: 2022-09-30

## 2022-09-30 PROCEDURE — 99214 OFFICE O/P EST MOD 30 MIN: CPT

## 2022-09-30 RX ORDER — ASPIRIN 325 MG/1
325 TABLET, FILM COATED ORAL DAILY
Qty: 30 | Refills: 1 | Status: ACTIVE | COMMUNITY
Start: 2022-09-30 | End: 1900-01-01

## 2022-09-30 NOTE — HISTORY OF PRESENT ILLNESS
[FreeTextEntry1] : The patient is a 52 yo female who injured her right foot this past Monday after she slipped on stairs.  She went to  this past Monday where she got x-rays and was diagnosed with a foot fracture.  Presents with Long CAM boot and crutches.  Swelling and bruising right foot.  Pain scale 6/10.  No calf pain, SOB, fevers, chills or night sweats.  No other complaints.

## 2022-09-30 NOTE — PHYSICAL EXAM
[de-identified] : Right foot Physical Examination:\par \par General: Alert and oriented x3.  In no acute distress.  Pleasant in nature with a normal affect.  No apparent respiratory distress. \par Erythema, Warmth, Rubor: Negative\par Swelling: Positive swelling and bruising present throughout the foot.\par \par ROM Ankle:\par 1. Dorsiflexion: 10 degrees\par 2. Plantarflexion: 40 degrees\par 3. Inversion: 20 degrees\par 4. Eversion: 10 degrees\par \par ROM of digits: Normal\par \par Pes Planus: Negative\par Pes Cavus: Negative\par \par Bunion: Negative\par Laine's Bunion (Bunionette): Negative\par Hammer Toe Deformity/Deformities: Negative\par \par Tenderness to Palpation: \par 1. Heel Pain: Negative\par 2. Midfoot Pain: Negative\par 3. First MTP Joint: Negative\par 4. Lis Franc Joint: Negative\par \par Tenderness Metatarsals:\par 1st MT: Negative\par 2nd MT: Negative\par 3rd MT: Negative\par 4th MT: Negative\par 5th MT: Negative\par Base of the 5th MT: Negative\par \par Ligament Pain:\par 1. Lis Franc Ligament: Negative\par 2. Plantar Fascia Ligament: Negative\par \par Strength: \par 5/5 TA/GS/EHL/FHL/EDL/ADD/ABD\par \par Pulses: 2+ DP/PT Pulses\par \par Capillary Refill Toes: <2 seconds\par \par Neuro: Intact motor and sensory throughout\par \par Additional Test:\par 1. Andrews's Squeeze Test: Negative\par 2. Calcaneal Squeeze Test: Negative\par \par *Patient has severe tenderness to palpation when palpating the navicular.\par \par \par *Right ankle exam: Pain over the lateral ligaments and pain over the deltoid ligaments. [de-identified] : ACC: [id number] EXAM: XR FOOT COMP MIN 3 VIEWS RT\par \par PROCEDURE DATE: 09/26/2022\par \par \par \par INTERPRETATION: RIGHT foot\par \par CLINICAL INFORMATION:Injury with Pain.\par \par TECHNIQUE: AP,lateral and oblique views.\par \par FINDINGS:\par Cortical avulsed fracture of the anterior navicular tarsal bone at the level of joint capsule insertion. Assess for point tenderness. Remaining osseous and joint structures of the RIGHT foot are intact.\par Soft tissues unremarkable.\par \par IMPRESSION:\par \par Vertical avulsed fracture of anterior navicular bone at joint capsule insertion. Assess for point tenderness.\par \par --- End of Report ---\par \par \par \par \par \par DERICK MCFARLAND MD; Attending Radiologist\par This document has been electronically signed. Sep 26 2022 11:32AM

## 2022-09-30 NOTE — ADDENDUM
[FreeTextEntry1] : I, Dora Ng, acted solely as a scribe for Dr. Ranjan Hall on this date 09/30/2022.\par \par All medical record entries made by the Scribe were at my, Dr. Ranjan Hall, direction and personally dictated by me on 09/30/2022. I have reviewed the chart and agree that the record accurately reflects my personal performance of the history, physical exam, assessment and plan. I have also personally directed, reviewed, and agreed with the chart.	\par

## 2022-10-06 ENCOUNTER — APPOINTMENT (OUTPATIENT)
Dept: ORTHOPEDIC SURGERY | Facility: CLINIC | Age: 52
End: 2022-10-06

## 2022-10-12 ENCOUNTER — APPOINTMENT (OUTPATIENT)
Dept: ORTHOPEDIC SURGERY | Facility: CLINIC | Age: 52
End: 2022-10-12

## 2022-10-12 PROCEDURE — 99214 OFFICE O/P EST MOD 30 MIN: CPT

## 2022-10-12 NOTE — REASON FOR VISIT
[Follow-Up Visit] : a follow-up visit for [Spouse] : spouse [FreeTextEntry2] : right foot injury, right ankle pain

## 2022-10-12 NOTE — ADDENDUM
[FreeTextEntry1] : I, Dora Ng, acted solely as a scribe for Dr. Ranjan Hall on this date 10/12/2022.\par \par All medical record entries made by the Scribe were at my, Dr. Ranjan Hall, direction and personally dictated by me on 10/12/2022. I have reviewed the chart and agree that the record accurately reflects my personal performance of the history, physical exam, assessment and plan. I have also personally directed, reviewed, and agreed with the chart.	\par

## 2022-10-12 NOTE — DISCUSSION/SUMMARY
[de-identified] : Today I had a lengthy discussion with the patient regarding their right foot injury. I have addressed all the patient's concerns surrounding the pathology of their condition. At this time I recommend the patient continue to use the long cam boot and crutches. She will continue aspirin 325 mg to take daily for DVT prophylaxis while she remains in the boot.  She will continue to ice and elevate the foot for swelling control.  She is using NSAIDs PRN for pain and swelling which she will continue to use. We discussed that once the MRI report is available, either myself or my PA Isreal Ambriz will contact the patient with results. The patient understood and verbally agreed to the treatment plan. All of their questions were answered and they were satisfied with the visit. The patient should call the office if they have any questions or experience worsening symptoms.

## 2022-10-12 NOTE — PHYSICAL EXAM
[de-identified] : Right foot Physical Examination:\par \par General: Alert and oriented x3.  In no acute distress.  Pleasant in nature with a normal affect.  No apparent respiratory distress. \par Erythema, Warmth, Rubor: Negative\par Swelling: Positive swelling and bruising present throughout the foot.\par \par ROM Ankle:\par 1. Dorsiflexion: 10 degrees\par 2. Plantarflexion: 40 degrees\par 3. Inversion: 20 degrees\par 4. Eversion: 10 degrees\par \par ROM of digits: Normal\par \par Pes Planus: Negative\par Pes Cavus: Negative\par \par Bunion: Negative\par Laine's Bunion (Bunionette): Negative\par Hammer Toe Deformity/Deformities: Negative\par \par Tenderness to Palpation: \par 1. Heel Pain: Negative\par 2. Midfoot Pain: Negative\par 3. First MTP Joint: Negative\par 4. Lis Franc Joint: Negative\par \par Tenderness Metatarsals:\par 1st MT: Negative\par 2nd MT: Negative\par 3rd MT: Negative\par 4th MT: Negative\par 5th MT: Negative\par Base of the 5th MT: Negative\par \par Ligament Pain:\par 1. Lis Franc Ligament: Negative\par 2. Plantar Fascia Ligament: Negative\par \par Strength: \par 5/5 TA/GS/EHL/FHL/EDL/ADD/ABD\par \par Pulses: 2+ DP/PT Pulses\par \par Capillary Refill Toes: <2 seconds\par \par Neuro: Intact motor and sensory throughout\par \par Additional Test:\par 1. Andrews's Squeeze Test: Negative\par 2. Calcaneal Squeeze Test: Negative\par \par *Patient has severe tenderness to palpation when palpating the navicular.\par \par \par *Right ankle exam: Pain over the lateral ligaments and pain over the deltoid ligaments. [de-identified] : MRI of the right ankle done on 10/11/2022 at [facility] results reviewed 10/12/2022 , results as reported in the chart:\par \par The report was unavailable at this time. The images were reviewed with the patient. The report will be scanned into the chart. Concern for navicular fracture.

## 2022-11-14 ENCOUNTER — APPOINTMENT (OUTPATIENT)
Dept: ORTHOPEDIC SURGERY | Facility: CLINIC | Age: 52
End: 2022-11-14

## 2022-11-14 PROCEDURE — 99213 OFFICE O/P EST LOW 20 MIN: CPT

## 2022-11-14 NOTE — ADDENDUM
[FreeTextEntry1] : I, Dora Ng, acted solely as a scribe for Dr. Ranjan Hall on this date 11/14/2022.\par \par All medical record entries made by the Scribe were at my, Dr. Ranjan Hall, direction and personally dictated by me on 11/14/2022. I have reviewed the chart and agree that the record accurately reflects my personal performance of the history, physical exam, assessment and plan. I have also personally directed, reviewed, and agreed with the chart.	\par

## 2022-11-14 NOTE — HISTORY OF PRESENT ILLNESS
[FreeTextEntry1] : 11/14/2022: The patient is a 52 year old female presenting for a follow-up evaluation of her right ankle/foot injury. The patient presents ambulating with crutches and is wearing a CAM boot. She reports continued pain over the navicular, however she states that pain has overall improved since her last evaluation. She is not taking Aspirin for DVT Prophylaxis. She denies any calf pain. She has no other complaints.  \par \par 10/12/2022: The patient returns to the office to review MRI results of the right ankle. The patient presents ambulating with crutches and is wearing a CAM boot. She has been nonweightbearing, though notes that she is able to place slight weight on her heel. She has not been icing her RLE. The swelling is reported to be improved since her last evaluation. No other complaints. \par \par 9/30/2022: The patient is a 50 yo female who injured her right foot this past Monday after she slipped on stairs.  She went to  this past Monday where she got x-rays and was diagnosed with a foot fracture.  Presents with Long CAM boot and crutches.  Swelling and bruising right foot.  Pain scale 6/10.  No calf pain, SOB, fevers, chills or night sweats.  No other complaints.

## 2022-11-14 NOTE — PHYSICAL EXAM
[de-identified] : Right foot Physical Examination:\par \par General: Alert and oriented x3.  In no acute distress.  Pleasant in nature with a normal affect.  No apparent respiratory distress. \par Erythema, Warmth, Rubor: Negative\par Swelling: Positive swelling and bruising present throughout the foot, improved.\par \par ROM Ankle:\par 1. Dorsiflexion: 10 degrees\par 2. Plantarflexion: 40 degrees\par 3. Inversion: 20 degrees\par 4. Eversion: 10 degrees\par \par ROM of digits: Normal\par \par Pes Planus: Negative\par Pes Cavus: Negative\par \par Bunion: Negative\par Laine's Bunion (Bunionette): Negative\par Hammer Toe Deformity/Deformities: Negative\par \par Tenderness to Palpation: \par 1. Heel Pain: Negative\par 2. Midfoot Pain: Negative\par 3. First MTP Joint: Negative\par 4. Lis Franc Joint: Negative\par \par Tenderness Metatarsals:\par 1st MT: Negative\par 2nd MT: Negative\par 3rd MT: Negative\par 4th MT: Negative\par 5th MT: Negative\par Base of the 5th MT: Negative\par \par Ligament Pain:\par 1. Lis Franc Ligament: Negative\par 2. Plantar Fascia Ligament: Negative\par \par Strength: \par 5/5 TA/GS/EHL/FHL/EDL/ADD/ABD\par \par Pulses: 2+ DP/PT Pulses\par \par Capillary Refill Toes: <2 seconds\par \par Neuro: Intact motor and sensory throughout\par \par Additional Test:\par 1. Andrews's Squeeze Test: Negative\par 2. Calcaneal Squeeze Test: Negative\par \par *Patient has tenderness to palpation when palpating the navicular, improved since last evaluation. \par \par \par *Right ankle exam: Pain over the lateral ligaments and pain over the deltoid ligaments, improved since last evaluation. [de-identified] : MRI of the right ankle done on 10/11/2022 at [facility] results reviewed 11/14/2022 , results as reported in the chart:\par \par Impression:\par 1. Displaced fracture of the anterolateral process of the calcaneus overlying the lateral margin of the cuboid with diffuse marrow edema tear of the calcaneocuboid joint. \par 2. Nondisplaced fracture of the proximal medial cuboid with proximal intra-articular extension ans partial tar of the bifurcate and spring ligament with diffuse soft tissue edema. \par 3. 3 mm evulsion injury dorsal talonavicular joint with tear of the capsule\par 4. Nondisplaced fracture of 10 mm accessory navicular with additional 10 mm displaced fracture the medial navicular with diffuse marrow edema. Posterior tibial tendinopathy and tenosynovitis. \par 5. Nondepressed subchondral fracture the plantar head of the talus and medial talar body. \par 6. Subacute partial tear of the lateral ligaments. Scarring of the tarsal sinus ligament and fat which can be seen with sinus Tarsi syndrome.

## 2022-11-14 NOTE — DISCUSSION/SUMMARY
[de-identified] : Today I had a lengthy discussion with the patient regarding their right ankle/foot pain. I have addressed all the patient's concerns surrounding the pathology of their condition. I recommend the patient undergo a course of physical therapy for the right foot/ankle  2-3 times a week for a total of 8-12 weeks. A prescription was given for the physical therapy today. The patient may begin to wean off the crutches. I recommended that the patient begin to transition out of the CAM boot to an ASO brace. The ASO brace was given today. The patient understood and verbally agreed to the treatment plan. All of their questions were answered and they were satisfied with the visit. The patient should call the office if they have any questions or experience worsening symptoms. I would like to see the patient back in the office in 4-6 weeks to reassess their condition. 				\par

## 2022-12-10 ENCOUNTER — NON-APPOINTMENT (OUTPATIENT)
Age: 52
End: 2022-12-10

## 2022-12-14 ENCOUNTER — APPOINTMENT (OUTPATIENT)
Dept: ORTHOPEDIC SURGERY | Facility: CLINIC | Age: 52
End: 2022-12-14

## 2022-12-14 DIAGNOSIS — M25.571 PAIN IN RIGHT ANKLE AND JOINTS OF RIGHT FOOT: ICD-10-CM

## 2022-12-14 DIAGNOSIS — S92.251A DISPLACED FRACTURE OF NAVICULAR [SCAPHOID] OF RIGHT FOOT, INITIAL ENCOUNTER FOR CLOSED FRACTURE: ICD-10-CM

## 2022-12-14 PROCEDURE — 99213 OFFICE O/P EST LOW 20 MIN: CPT

## 2022-12-14 NOTE — HISTORY OF PRESENT ILLNESS
[FreeTextEntry1] : 12/14/2022: The patient is a 52 year old female presenting for a follow-up evaluation of her right ankle/foot injury. The patient has been attending physical therapy for this issue twice weekly with improvement, though she reports concerns of increased pain and stiffness. 2 weeks prior to the index injury, the patient reports that she felt air in her foot while at work, with concerns of global swelling 15 minutes after. She then took NSAIDs and notes improvement, then being seen by her Podiatrist where the patient reports that she was diagnosed with chronic fractures to her foot and a ganglion cyst. The patient reports that this same event occurred a days prior and is very concerned in regards to a ganglion cyst. She presents today for further evaluation. She is wearing sneakers and is walking without assistance. No other complaints.

## 2022-12-14 NOTE — ADDENDUM
[FreeTextEntry1] : I, Dora Ng, acted solely as a scribe for Dr. Ranjan Hall on this date 12/14/2022.\par \par All medical record entries made by the Scribe were at my, Dr. Ranjan Hall, direction and personally dictated by me on 12/14/2022. I have reviewed the chart and agree that the record accurately reflects my personal performance of the history, physical exam, assessment and plan. I have also personally directed, reviewed, and agreed with the chart.	\par

## 2022-12-14 NOTE — PHYSICAL EXAM
[de-identified] : Right foot Physical Examination:\par \par General: Alert and oriented x3.  In no acute distress.  Pleasant in nature with a normal affect.  No apparent respiratory distress. \par Erythema, Warmth, Rubor: Negative\par Swelling: Positive swelling and bruising present throughout the foot, improved.\par \par ROM Ankle:\par 1. Dorsiflexion: 10 degrees\par 2. Plantarflexion: 40 degrees\par 3. Inversion: 20 degrees\par 4. Eversion: 10 degrees\par \par ROM of digits: Normal\par \par Pes Planus: Negative\par Pes Cavus: Negative\par \par Bunion: Negative\par Laine's Bunion (Bunionette): Negative\par Hammer Toe Deformity/Deformities: Negative\par \par Tenderness to Palpation: \par 1. Heel Pain: Negative\par 2. Midfoot Pain: Negative\par 3. First MTP Joint: Negative\par 4. Lis Franc Joint: Negative\par \par Tenderness Metatarsals:\par 1st MT: Negative\par 2nd MT: Negative\par 3rd MT: Negative\par 4th MT: Negative\par 5th MT: Negative\par Base of the 5th MT: Negative\par \par Ligament Pain:\par 1. Lis Franc Ligament: Negative\par 2. Plantar Fascia Ligament: Negative\par \par Strength: \par 5/5 TA/GS/EHL/FHL/EDL/ADD/ABD\par \par Pulses: 2+ DP/PT Pulses\par \par Capillary Refill Toes: <2 seconds\par \par Neuro: Intact motor and sensory throughout\par \par Additional Test:\par 1. Andrews's Squeeze Test: Negative\par 2. Calcaneal Squeeze Test: Negative\par \par *Patient has tenderness to palpation when palpating the navicular, improved since last evaluation. \par \par *Right ankle exam: Pain over the lateral ligaments and pain over the deltoid ligaments, improved since last evaluation. [de-identified] : MRI of the right ankle done on 10/11/2022 at [facility] results reviewed 11/14/2022 , results as reported in the chart:\par \par Impression:\par 1. Displaced fracture of the anterolateral process of the calcaneus overlying the lateral margin of the cuboid with diffuse marrow edema tear of the calcaneocuboid joint. \par 2. Nondisplaced fracture of the proximal medial cuboid with proximal intra-articular extension ans partial tar of the bifurcate and spring ligament with diffuse soft tissue edema. \par 3. 3 mm evulsion injury dorsal talonavicular joint with tear of the capsule\par 4. Nondisplaced fracture of 10 mm accessory navicular with additional 10 mm displaced fracture the medial navicular with diffuse marrow edema. Posterior tibial tendinopathy and tenosynovitis. \par 5. Nondepressed subchondral fracture the plantar head of the talus and medial talar body. \par 6. Subacute partial tear of the lateral ligaments. Scarring of the tarsal sinus ligament and fat which can be seen with sinus Tarsi syndrome.

## 2022-12-14 NOTE — DISCUSSION/SUMMARY
[de-identified] : Today I had a lengthy discussion with the patient regarding their right ankle/foot pain. I have addressed all the patient's concerns surrounding the pathology of their condition. I recommend the patient undergo a course of physical therapy for the right foot/ankle  2-3 times a week for a total of 8-12 weeks. A prescription was given for the physical therapy today. The patient understood and verbally agreed to the treatment plan. All of their questions were answered and they were satisfied with the visit. The patient should call the office if they have any questions or experience worsening symptoms. I would like to see the patient back in the office in 6-8 weeks to reassess their condition. 				\par

## 2023-01-01 ENCOUNTER — NON-APPOINTMENT (OUTPATIENT)
Age: 53
End: 2023-01-01

## 2023-02-06 ENCOUNTER — APPOINTMENT (OUTPATIENT)
Dept: ORTHOPEDIC SURGERY | Facility: CLINIC | Age: 53
End: 2023-02-06

## 2023-08-08 NOTE — H&P PST ADULT - ALCOHOL USE HISTORY SINGLE SELECT
never Bexarotene Pregnancy And Lactation Text: This medication is Pregnancy Category X and should not be given to women who are pregnant or may become pregnant. This medication should not be used if you are breast feeding.

## 2024-09-09 ENCOUNTER — APPOINTMENT (OUTPATIENT)
Dept: VASCULAR SURGERY | Facility: CLINIC | Age: 54
End: 2024-09-09
Payer: COMMERCIAL

## 2024-09-09 VITALS
HEIGHT: 65 IN | WEIGHT: 140 LBS | BODY MASS INDEX: 23.32 KG/M2 | HEART RATE: 76 BPM | DIASTOLIC BLOOD PRESSURE: 77 MMHG | SYSTOLIC BLOOD PRESSURE: 115 MMHG | OXYGEN SATURATION: 100 %

## 2024-09-09 DIAGNOSIS — I83.11 VARICOSE VEINS OF RIGHT LOWER EXTREMITY WITH INFLAMMATION: ICD-10-CM

## 2024-09-09 DIAGNOSIS — M79.605 PAIN IN LEFT LEG: ICD-10-CM

## 2024-09-09 DIAGNOSIS — I83.12 VARICOSE VEINS OF RIGHT LOWER EXTREMITY WITH INFLAMMATION: ICD-10-CM

## 2024-09-09 PROCEDURE — 99203 OFFICE O/P NEW LOW 30 MIN: CPT

## 2024-09-09 NOTE — HISTORY OF PRESENT ILLNESS
[FreeTextEntry1] : 51 y/o female s/p LLE GSV RFA 2/12/20 and BLE sclerotherapy x 6. She has been wearing compression stockings, she is active, ambulates frequently and elevates her legs at rest. She has no swelling or discoloration after procedures. She now has a new pain to left inner thigh and posterior calf. Pain is sharp, stabbing and intermittent to medial left thigh, usually after standing for long periods of time at work. No fever or chills. She is a non-smoker. [de-identified] : last seen by Dr. Dunaway in 2021 had multiple sclerotherapy procedures and left GSV RFA. Over last year or so has been having aching in LLE behind knee that is debilitating and interfering with her ability to work (is a ).  She also notes multiple new spider veins that are achy and itchy.  She has a recent trauma (8/29) with fracture of left foot is in an immobilizing boot

## 2024-09-09 NOTE — ASSESSMENT
[FreeTextEntry1] : 52yo female with recurrent lle pain and spider veins -will return in 1 month for duplex given pain in setting of fracture and immobility will also assess for reflux  follow 1 month

## 2024-10-21 ENCOUNTER — APPOINTMENT (OUTPATIENT)
Dept: VASCULAR SURGERY | Facility: CLINIC | Age: 54
End: 2024-10-21

## 2025-01-10 NOTE — HISTORY OF PRESENT ILLNESS
[FreeTextEntry1] : 10/12/2022: The patient returns to the office to review MRI results of the right ankle. The patient presents ambulating with crutches and is wearing a CAM boot. She has been nonweightbearing, though notes that she is able to place slight weight on her heel. She has not been icing her RLE. The swelling is reported to be improved since her last evaluation. No other complaints. \par \par 9/30/2022: The patient is a 50 yo female who injured her right foot this past Monday after she slipped on stairs.  She went to  this past Monday where she got x-rays and was diagnosed with a foot fracture.  Presents with Long CAM boot and crutches.  Swelling and bruising right foot.  Pain scale 6/10.  No calf pain, SOB, fevers, chills or night sweats.  No other complaints.  normal/regular rate and rhythm/S1 S2 present/no gallops/no rub/no murmur/vascular

## 2025-01-21 NOTE — DISCUSSION/SUMMARY
[de-identified] : At this time I recommend the patient continue to use the long cam boot.  I prescribed her aspirin 325 mg to take daily for DVT prophylaxis while she remains in the boot.  She will continue to ice and elevate the foot for swelling control.  She is using Advil for pain and swelling which she will continue to use.  The patient is scheduled for an MRI this Thursday as ordered by an outside provider. Recommended to the patient to forward the MRI results to the office.  All of her questions were answered and she understood the treatment course.
medications/saline lock

## 2025-03-30 ENCOUNTER — NON-APPOINTMENT (OUTPATIENT)
Age: 55
End: 2025-03-30

## 2025-03-31 ENCOUNTER — APPOINTMENT (OUTPATIENT)
Dept: VASCULAR SURGERY | Facility: CLINIC | Age: 55
End: 2025-03-31

## 2025-04-14 ENCOUNTER — APPOINTMENT (OUTPATIENT)
Dept: VASCULAR SURGERY | Facility: CLINIC | Age: 55
End: 2025-04-14

## 2025-04-14 ENCOUNTER — APPOINTMENT (OUTPATIENT)
Dept: VASCULAR SURGERY | Facility: CLINIC | Age: 55
End: 2025-04-14
Payer: COMMERCIAL

## 2025-04-14 VITALS
HEIGHT: 65 IN | BODY MASS INDEX: 23.32 KG/M2 | SYSTOLIC BLOOD PRESSURE: 117 MMHG | WEIGHT: 140 LBS | HEART RATE: 67 BPM | DIASTOLIC BLOOD PRESSURE: 67 MMHG

## 2025-04-14 DIAGNOSIS — I83.11 VARICOSE VEINS OF RIGHT LOWER EXTREMITY WITH INFLAMMATION: ICD-10-CM

## 2025-04-14 DIAGNOSIS — I83.12 VARICOSE VEINS OF RIGHT LOWER EXTREMITY WITH INFLAMMATION: ICD-10-CM

## 2025-04-14 DIAGNOSIS — I78.1 NEVUS, NON-NEOPLASTIC: ICD-10-CM

## 2025-04-14 PROCEDURE — 93970 EXTREMITY STUDY: CPT

## 2025-04-14 PROCEDURE — 99212 OFFICE O/P EST SF 10 MIN: CPT

## 2025-04-15 PROBLEM — I78.1 SPIDER VEINS: Status: ACTIVE | Noted: 2025-04-15

## 2025-05-29 ENCOUNTER — APPOINTMENT (OUTPATIENT)
Dept: VASCULAR SURGERY | Facility: CLINIC | Age: 55
End: 2025-05-29
Payer: SELF-PAY

## 2025-05-29 DIAGNOSIS — I83.12 VARICOSE VEINS OF RIGHT LOWER EXTREMITY WITH INFLAMMATION: ICD-10-CM

## 2025-05-29 DIAGNOSIS — I78.1 NEVUS, NON-NEOPLASTIC: ICD-10-CM

## 2025-05-29 DIAGNOSIS — I83.11 VARICOSE VEINS OF RIGHT LOWER EXTREMITY WITH INFLAMMATION: ICD-10-CM

## 2025-05-29 PROCEDURE — 36468 NJX SCLRSNT SPIDER VEINS: CPT

## 2025-06-18 ENCOUNTER — APPOINTMENT (OUTPATIENT)
Dept: VASCULAR SURGERY | Facility: CLINIC | Age: 55
End: 2025-06-18
Payer: SELF-PAY

## 2025-06-18 PROCEDURE — 99212 OFFICE O/P EST SF 10 MIN: CPT
